# Patient Record
Sex: FEMALE | URBAN - METROPOLITAN AREA
[De-identification: names, ages, dates, MRNs, and addresses within clinical notes are randomized per-mention and may not be internally consistent; named-entity substitution may affect disease eponyms.]

---

## 2020-07-28 ENCOUNTER — APPOINTMENT (RX ONLY)
Dept: URBAN - METROPOLITAN AREA CLINIC 51 | Facility: CLINIC | Age: 60
Setting detail: DERMATOLOGY
End: 2020-07-28

## 2020-07-28 DIAGNOSIS — D485 NEOPLASM OF UNCERTAIN BEHAVIOR OF SKIN: ICD-10-CM

## 2020-07-28 DIAGNOSIS — D22 MELANOCYTIC NEVI: ICD-10-CM

## 2020-07-28 PROBLEM — D22.9 MELANOCYTIC NEVI, UNSPECIFIED: Status: ACTIVE | Noted: 2020-07-28

## 2020-07-28 PROBLEM — D48.5 NEOPLASM OF UNCERTAIN BEHAVIOR OF SKIN: Status: ACTIVE | Noted: 2020-07-28

## 2020-07-28 PROCEDURE — 99243 OFF/OP CNSLTJ NEW/EST LOW 30: CPT

## 2020-07-28 PROCEDURE — ? DEFER

## 2020-07-28 PROCEDURE — ? COUNSELING

## 2020-07-28 PROCEDURE — ? NOTED ON EXAM BUT NOT TREATED

## 2020-07-28 NOTE — PROCEDURE: NOTED ON EXAM BUT NOT TREATED
High blood cholesterol    Hypertension    Kidney stones    Sleep apnea
Text: The above diagnosis and findings were noted on the exam but not discussed at this time with the patient.
Detail Level: Zone

## 2020-07-28 NOTE — PROCEDURE: DEFER
Introduction Text (Please End With A Colon): Procedure to be performed at follow ups
Instructions (Optional): Patient prefers general anesthesia procedure. Recommend follow up with primary doctor for referral to general surgeon for proper sedation.
Detail Level: Zone
Procedure To Be Performed At Next Visit: Excision